# Patient Record
Sex: FEMALE | Race: WHITE | Employment: OTHER | URBAN - METROPOLITAN AREA
[De-identification: names, ages, dates, MRNs, and addresses within clinical notes are randomized per-mention and may not be internally consistent; named-entity substitution may affect disease eponyms.]

---

## 2018-10-19 ENCOUNTER — HOSPITAL ENCOUNTER (OUTPATIENT)
Dept: CT IMAGING | Age: 58
Discharge: HOME | End: 2018-10-19
Attending: UROLOGY
Payer: COMMERCIAL

## 2018-10-19 DIAGNOSIS — R31.0 GROSS HEMATURIA: ICD-10-CM

## 2018-10-19 PROCEDURE — 74011636320 HC RX REV CODE- 636/320: Performed by: UROLOGY

## 2018-10-19 PROCEDURE — 74178 CT ABD&PLV WO CNTR FLWD CNTR: CPT

## 2018-10-19 RX ADMIN — IOPAMIDOL 100 ML: 755 INJECTION, SOLUTION INTRAVENOUS at 15:40

## 2019-02-07 ENCOUNTER — HOSPITAL ENCOUNTER (OUTPATIENT)
Age: 59
Setting detail: OUTPATIENT SURGERY
Discharge: HOME | End: 2019-02-07
Attending: UROLOGY | Admitting: UROLOGY
Payer: COMMERCIAL

## 2019-02-07 VITALS
HEIGHT: 67 IN | TEMPERATURE: 98.5 F | OXYGEN SATURATION: 100 % | HEART RATE: 73 BPM | RESPIRATION RATE: 9 BRPM | BODY MASS INDEX: 21.74 KG/M2 | SYSTOLIC BLOOD PRESSURE: 118 MMHG | DIASTOLIC BLOOD PRESSURE: 72 MMHG | WEIGHT: 138.5 LBS

## 2019-02-07 PROCEDURE — 50590 FRAGMENTING OF KIDNEY STONE: CPT | Performed by: UROLOGY

## 2019-02-07 PROCEDURE — 74011250636 HC RX REV CODE- 250/636: Performed by: UROLOGY

## 2019-02-07 PROCEDURE — 99153 MOD SED SAME PHYS/QHP EA: CPT

## 2019-02-07 PROCEDURE — 76010000138 HC OR TIME 0.5 TO 1 HR: Performed by: UROLOGY

## 2019-02-07 PROCEDURE — 74011250636 HC RX REV CODE- 250/636

## 2019-02-07 PROCEDURE — 77030032490 HC SLV COMPR SCD KNE COVD -B: Performed by: UROLOGY

## 2019-02-07 PROCEDURE — 76210000020 HC REC RM PH II FIRST 0.5 HR: Performed by: UROLOGY

## 2019-02-07 PROCEDURE — 99152 MOD SED SAME PHYS/QHP 5/>YRS: CPT

## 2019-02-07 RX ORDER — NALOXONE HYDROCHLORIDE 1 MG/ML
1 INJECTION INTRAMUSCULAR; INTRAVENOUS; SUBCUTANEOUS AS NEEDED
Status: DISCONTINUED | OUTPATIENT
Start: 2019-02-07 | End: 2019-02-07 | Stop reason: HOSPADM

## 2019-02-07 RX ORDER — MIDAZOLAM HYDROCHLORIDE 5 MG/ML
6 INJECTION INTRAMUSCULAR; INTRAVENOUS AS NEEDED
Status: DISCONTINUED | OUTPATIENT
Start: 2019-02-07 | End: 2019-02-07 | Stop reason: HOSPADM

## 2019-02-07 RX ORDER — OXYCODONE AND ACETAMINOPHEN 5; 325 MG/1; MG/1
1 TABLET ORAL
Status: CANCELLED | OUTPATIENT
Start: 2019-02-07

## 2019-02-07 RX ORDER — OXYCODONE AND ACETAMINOPHEN 5; 325 MG/1; MG/1
2 TABLET ORAL
Status: CANCELLED | OUTPATIENT
Start: 2019-02-07

## 2019-02-07 RX ORDER — SODIUM CHLORIDE, SODIUM LACTATE, POTASSIUM CHLORIDE, CALCIUM CHLORIDE 600; 310; 30; 20 MG/100ML; MG/100ML; MG/100ML; MG/100ML
125 INJECTION, SOLUTION INTRAVENOUS CONTINUOUS
Status: DISCONTINUED | OUTPATIENT
Start: 2019-02-07 | End: 2019-02-07 | Stop reason: HOSPADM

## 2019-02-07 RX ORDER — FLUMAZENIL 0.1 MG/ML
0.5 INJECTION INTRAVENOUS AS NEEDED
Status: DISCONTINUED | OUTPATIENT
Start: 2019-02-07 | End: 2019-02-07 | Stop reason: HOSPADM

## 2019-02-07 RX ORDER — SODIUM CHLORIDE, SODIUM LACTATE, POTASSIUM CHLORIDE, CALCIUM CHLORIDE 600; 310; 30; 20 MG/100ML; MG/100ML; MG/100ML; MG/100ML
75 INJECTION, SOLUTION INTRAVENOUS CONTINUOUS
Status: CANCELLED | OUTPATIENT
Start: 2019-02-07

## 2019-02-07 RX ORDER — FENTANYL CITRATE 50 UG/ML
300 INJECTION, SOLUTION INTRAMUSCULAR; INTRAVENOUS AS NEEDED
Status: DISCONTINUED | OUTPATIENT
Start: 2019-02-07 | End: 2019-02-07 | Stop reason: HOSPADM

## 2019-02-07 RX ADMIN — FENTANYL CITRATE 100 MCG: 50 INJECTION, SOLUTION INTRAMUSCULAR; INTRAVENOUS at 17:50

## 2019-02-07 RX ADMIN — MIDAZOLAM HYDROCHLORIDE 2 MG: 5 INJECTION INTRAMUSCULAR; INTRAVENOUS at 17:34

## 2019-02-07 RX ADMIN — FENTANYL CITRATE 100 MCG: 50 INJECTION, SOLUTION INTRAMUSCULAR; INTRAVENOUS at 17:34

## 2019-02-07 RX ADMIN — SODIUM CHLORIDE, SODIUM LACTATE, POTASSIUM CHLORIDE, AND CALCIUM CHLORIDE 125 ML/HR: 600; 310; 30; 20 INJECTION, SOLUTION INTRAVENOUS at 16:40

## 2019-02-07 NOTE — INTERVAL H&P NOTE
H&P Update: 
Kiley Guevara was seen and examined. History and physical has been reviewed. The patient has been examined.  There have been no significant clinical changes since the completion of the originally dated History and Physical. 
 
Signed By: Dorothy Todd MD   
 February 7, 2019 4:46 PM

## 2019-02-07 NOTE — INTERVAL H&P NOTE
H&P Update: 
Inna Ramirez was seen and examined. History and physical has been reviewed. The patient has been examined.  There have been no significant clinical changes since the completion of the originally dated History and Physical. 
 
Signed By: Favian Garcia MD   
 February 7, 2019 4:48 PM

## 2019-02-07 NOTE — H&P
Urology [address]  [address] Tel: [phone] Fax: [phone] Patient: Zulay Lazo YOB: 1960 Date: 02/05/2019 Visit Type: Pre Op H&P Assessment/Plan # Detail Type Description 1. Assessment Gross hematuria (R31.0). Patient Plan Pt had gross hematuria and aury flank pain. Today she underwent a cysto, no abnormalities were identified. She did have a CT scan on 10/19/2018 which revealed a 3mm non-obstructing stone in the mid pole of the right kidney, a 5x8mm stone in the mid pole of the left kidney. No obstructing stone was noted. No other abnormalities were noted. I did explain to the pt that I did have some concern about the size the stone in the mid pole of the left kidney. We discussed options and she would like to pursue a lithotripsy. 2. Assessment Kidney stone (N20.0). Patient Plan Pt noted to have aury renal stones. I am concerned about the one in the left kidney due to the size. We will obtain a KUB. If we can see the stone we will schedule her for a lithotripsy. All risks including pain, infection, bleeding, need for possible repeat procedure were reviewed. She understands and agrees. 3. Assessment Frequency of micturition (R35.0). Patient Plan Pt treated at her last visit with 25mg samples of Myrbetriq. This is working well. She still had not started the 50mg samples that I had given her originally so she will increase up to 50mg and let me know if she needs an Rx and for which dosing. 4. Assessment Urgency of urination (R39.15). 5. Assessment Urge incontinence (N39.41). This 60year old female presents for Overactive Bladder and Kidney and/or Ureteral Stone. History of Present Illness: 1. Overactive Bladder Onset was gradual. It occurs daily. The problem is worse. Associated symptoms include hematuria, urgency and urinary incontinence (urgency). Pertinent negatives include chills, constipation, fever and nocturia. Additional information: Pt with significant urge incontinence. Her daughter has this as well. She does not drink caffeine  She is on 25mg samples and works well. She will advance to 50mg if needed. Brandyn Crawford 2.  Kidney and/or Ureteral Stone Onset was sudden. The problem is with no change. Location of pain is none. The pain does not radiate. Associated symptoms include hematuria. Pertinent negatives include chills, constipation, diarrhea, fever, nausea and vomiting. Additional information: Pt noted to have aury renal stones. I am concerned about the one in the left kidney due to the size. KUB confirms 5x8mm stone mid pole LEFT Kidney. PAST MEDICAL/SURGICAL HISTORY   (Reviewed, updated) Disease/disorder Onset Date Management Date Comments  
  dermatology surgical intervention  [facility] 2015 - basal cell carcinoma, atypical mole  
traumatic injury  shoulder surgery 2014   
  screening colonoscopy 10/17/2013 PROBLEM LIST:    
Problem Description Onset Date Chronic Clinical Status Notes Broken bone  Y Medications (active prior to today) Medication Instructions Start Date Stop Date Refilled Elsewhere  
calcium carbonate 300 mg (750 mg) chewable tablet take 1 tablet by oral route 2 times every day 2018   N Vitamin D3 1,000 unit tablet Pt takes 800 mg in the am and 1000mg in pm. 2018   N Allergies Ingredient Reaction (Severity) Medication Name Comment NO KNOWN ALLERGIES Family History  (Reviewed, updated) Relationship Family Member Name  Age at Death Condition Onset Age Cause of Death Father  Y  Myocardial infarction  Y Father  Marely Royal Father  Y  Heart disease  N Mother    Cancer, ovarian  N Mother    Cancer -Non-Hodgkin's lymphoma 75 [address] Social History:  (Reviewed, updated) Preferred language is [place]. Smoking status: Never smoker. ALCOHOL There is no history of alcohol use. CAFFEINE The patient does not use caffeine. Review of Systems System Neg/Pos Details Constitutional Negative Chills and Fever. ENMT Negative Ear infections and Sore throat. Eyes Negative Blurred vision, Double vision and Eye pain. Respiratory Negative Asthma, Chronic cough, Dyspnea and Wheezing. Cardio Negative Chest pain. GI Negative Constipation, Decreased appetite, Diarrhea, Nausea and Vomiting.  Positive Hematuria, Urgency, Urinary incontinence.  Negative Nocturia. Endocrine Negative Cold intolerance, Heat intolerance, Increased thirst and Weight loss. Neuro Negative Headache and Tremors. Psych Negative Anxiety and Depression. Integumentary Negative Itching skin and Rash. MS Negative Back pain and Joint pain. Hema/Lymph Negative Easy bleeding. Physical Exam 
Exam Findings Details Constitutional Normal Well developed. Neck Exam Normal Inspection - Normal.  
Respiratory Normal Inspection - Normal.  
Genitourinary Normal Urethral meatus - Normal. External genitalia - Normal. Perineum - Normal.  
Extremity Normal No Edema. Psychiatric Normal Orientation - Oriented to time, place, person & situation. Appropriate mood and affect. Medications (added, continued, or stopped today) Start Date Medication Directions PRN Status PRN Reason Instruction Stop Date  
01/31/2018 calcium carbonate 300 mg (750 mg) chewable tablet take 1 tablet by oral route 2 times every day N     
01/31/2018 Vitamin D3 1,000 unit tablet Pt takes 800 mg in the am and 1000mg in pm. N Active Patient Care Team Members Name Contact Agency Type Support Role Relationship Active Date Inactive Date Specialty Tomas Dickson   Patient provider PCP   Family Practice Jaciel Us   encounter provider    Urology Pop Maradiaga   encounter provider    Endocrinology Percy Adames   Emergency Contact Spouse Alec Terrazasing    Spouse Provider:  
Jany Harris  02/06/2019 9:43 PM  
Document generated by:  Salvador Rios.  Julianne 02/06/2019 09:43 PM

## 2019-02-07 NOTE — DISCHARGE INSTRUCTIONS
DISCHARGE SUMMARY from Nurse    PATIENT INSTRUCTIONS:    After general anesthesia or intravenous sedation, for 24 hours or while taking prescription Narcotics:  · Limit your activities  · Do not drive and operate hazardous machinery  · Do not make important personal or business decisions  · Do  not drink alcoholic beverages  · If you have not urinated within 8 hours after discharge, please contact your surgeon on call. Report the following to your surgeon:  · Excessive pain, swelling, redness or odor of or around the surgical area  · Temperature over 100.5  · Nausea and vomiting lasting longer than 4 hours or if unable to take medications  · Any signs of decreased circulation or nerve impairment to extremity: change in color, persistent  numbness, tingling, coldness or increase pain  · Any questions    What to do at Home:  Recommended activity: Activity as tolerated and no driving for today    If you experience any of the following symptoms above, please follow up with Dr. Reynaldo Watson. *  Please give a list of your current medications to your Primary Care Provider. *  Please update this list whenever your medications are discontinued, doses are      changed, or new medications (including over-the-counter products) are added. *  Please carry medication information at all times in case of emergency situations. These are general instructions for a healthy lifestyle:    No smoking/ No tobacco products/ Avoid exposure to second hand smoke  Surgeon General's Warning:  Quitting smoking now greatly reduces serious risk to your health.     Obesity, smoking, and sedentary lifestyle greatly increases your risk for illness    A healthy diet, regular physical exercise & weight monitoring are important for maintaining a healthy lifestyle    You may be retaining fluid if you have a history of heart failure or if you experience any of the following symptoms:  Weight gain of 3 pounds or more overnight or 5 pounds in a week, increased swelling in our hands or feet or shortness of breath while lying flat in bed. Please call your doctor as soon as you notice any of these symptoms; do not wait until your next office visit. Recognize signs and symptoms of STROKE:    F-face looks uneven    A-arms unable to move or move unevenly    S-speech slurred or non-existent    T-time-call 911 as soon as signs and symptoms begin-DO NOT go       Back to bed or wait to see if you get better-TIME IS BRAIN. Warning Signs of HEART ATTACK     Call 911 if you have these symptoms:   Chest discomfort. Most heart attacks involve discomfort in the center of the chest that lasts more than a few minutes, or that goes away and comes back. It can feel like uncomfortable pressure, squeezing, fullness, or pain.  Discomfort in other areas of the upper body. Symptoms can include pain or discomfort in one or both arms, the back, neck, jaw, or stomach.  Shortness of breath with or without chest discomfort.  Other signs may include breaking out in a cold sweat, nausea, or lightheadedness. Don't wait more than five minutes to call 911 - MINUTES MATTER! Fast action can save your life. Calling 911 is almost always the fastest way to get lifesaving treatment. Emergency Medical Services staff can begin treatment when they arrive -- up to an hour sooner than if someone gets to the hospital by car. The discharge information has been reviewed with the patient and caregiver. The patient and caregiver verbalized understanding. Discharge medications reviewed with the patient and caregiver and appropriate educational materials and side effects teaching were provided.   ___________________________________________________________________________________________________________________________________    Patient armband removed and shredded

## 2021-03-25 NOTE — PROCEDURES
Extracorporeal Shock Wave Lithotripsy Operative Report    Date of Surgery: 2/7/2019    Preoperative Diagnosis: LEFT KIDNEY STONES    Postoperative Diagnosis:  left renal calculus    Surgeon: Dariel Benton MD    Assistants: Surgeon(s):  Juan Mcgee MD    Anesthesia:  Con-Sed     Procedure: Procedure(s):  LEFT RENAL EXTRA CORPOREAL SHOCKWAVE LITHOTRIPSY (IV SEDATION)    Procedure in Detail:    The risks, benefits, complications, treatment options, and expected outcomes were discussed with the patient. The patient concurred with the proposed plan, giving informed consent. Time out was held prior to procedure. The patient was placed in the supine position. The stone was aligned using fluoroscopic examination. The patient was then given 3000 at a maximum kV of 7. The patient tolerated the procedure well. Findings: There appeared to be mild changes in the stone.     Estimated Blood Loss:  None    Specimens: none       Implants: none            Condition: stable    Signed By: Dariel Benton MD     February 7, 2019 none

## 2022-04-22 ENCOUNTER — TRANSCRIBE ORDER (OUTPATIENT)
Dept: REGISTRATION | Age: 62
End: 2022-04-22

## 2022-04-22 ENCOUNTER — HOSPITAL ENCOUNTER (OUTPATIENT)
Dept: NON INVASIVE DIAGNOSTICS | Age: 62
Discharge: HOME | End: 2022-04-22
Payer: COMMERCIAL

## 2022-04-22 DIAGNOSIS — Z15.01 GENETIC SUSCEPTIBILITY TO MALIGNANT NEOPLASM OF BREAST: ICD-10-CM

## 2022-04-22 DIAGNOSIS — Z15.09 GENETIC SUSCEPTIBILITY TO OTHER MALIGNANT NEOPLASM: ICD-10-CM

## 2022-04-22 DIAGNOSIS — Z15.09 GENETIC SUSCEPTIBILITY TO OTHER MALIGNANT NEOPLASM: Primary | ICD-10-CM

## 2022-04-22 LAB
ATRIAL RATE: 48 BPM
CALCULATED P AXIS, ECG09: 82 DEGREES
CALCULATED R AXIS, ECG10: 50 DEGREES
CALCULATED T AXIS, ECG11: 67 DEGREES
DIAGNOSIS, 93000: NORMAL
P-R INTERVAL, ECG05: 196 MS
Q-T INTERVAL, ECG07: 454 MS
QRS DURATION, ECG06: 90 MS
QTC CALCULATION (BEZET), ECG08: 405 MS
VENTRICULAR RATE, ECG03: 48 BPM

## 2022-04-22 PROCEDURE — 93005 ELECTROCARDIOGRAM TRACING: CPT

## 2022-05-09 ENCOUNTER — HOSPITAL ENCOUNTER (OUTPATIENT)
Dept: LAB | Age: 62
Discharge: HOME | End: 2022-05-09
Payer: COMMERCIAL

## 2022-05-09 PROCEDURE — 88305 TISSUE EXAM BY PATHOLOGIST: CPT

## 2023-05-23 ENCOUNTER — HOSPITAL ENCOUNTER (OUTPATIENT)
Facility: HOSPITAL | Age: 63
Setting detail: RECURRING SERIES
Discharge: HOME | End: 2023-05-26
Payer: COMMERCIAL

## 2023-05-23 PROCEDURE — 97535 SELF CARE MNGMENT TRAINING: CPT

## 2023-05-23 PROCEDURE — 97161 PT EVAL LOW COMPLEX 20 MIN: CPT

## 2023-05-23 NOTE — PROGRESS NOTES
[facility]  [address], [address]  Ph [phone]  Fx [phone]    Plan of Care/ Statement of Necessity for Physical Therapy Services      Patient name: Noam Ayala Start of Care: 2023   Referral source: Yesika Hebert,* : 1960    Medical Diagnosis: Urge incontinence [N39.41]   Onset Date:2013    Treatment Diagnosis: Urge incontinence [N39.41]   Prior Hospitalization: see medical history Provider#: [id number]   Medications: Verified on Patient summary List   Comorbidities:  2 pregnancies, both vaginal; Hx tearing; oophorectomy 1 year ago; Hx kidney stones, Hx hemmorroids from birth of first child, osteopenia, thyroid nodules  Prior Level of Function: no leakage, living life without being interrupted by urinary urgency, no anxiety to know where bathrooms are      The Plan of Care and following information is based on the information from the initial evaluation. Assessment/ key information:  Patient is a 57year old female presenting to Physical Therapy with c/o urinary urgency leading to urinary incontinence which is limiting ability function at previous level. Patient's internal PFM assessment revealed normal muscle tone without pain and good strength and endurance, indicating symptoms are likely due to poor bladder fitness and habits. On interview, patient has infrequent stress UI that does not bother her. Patient presents with limited understanding of bladder and bowel anatomy/function, dietary irritants, and urge suppression. I feel patient would benefit from skilled therapeutic intervention to optimize highest functional level possible.    Evaluation Complexity HistoryHIGH Complexity :3+ comorbidities / personal factors will impact the outcome/ POC  ; Examination LOW Complexity : 1-2 Standardized tests and measures addressing body structure, function, activity limitation and / or participation in recreation  ;Presentation LOW Complexity

## 2023-05-23 NOTE — PROGRESS NOTES
Physical Therapy Evaluation        Patient Name: Britt Lugo    Date: 2023    : 1960  Insurance: Payor: Caitlyn Cullen / Plan: MARIA EUGENIA WADDELL FEDERAL / Product Type: *No Product type* /      Patient  verified yes     Visit #   Current / Total 1 8   Time   In / Out 2:32 3:15   Pain   In / Out 0 0   Subjective Functional Status/Changes: See below   Changes to:  Meds, Allergies, Med Hx, Sx Hx?   If yes, update Summary List yes       TREATMENT AREA =  Urge incontinence [N39.41]    SUBJECTIVE   Current Complaint/History: urinary urgency multiple times daily leading to some incontinence over the last 10 years, insidiously    Symptom progression: [ X]no change since onset    Pain Levels (0-10 scale): no pain with symptoms, patient denies    Previous Treatment/Compliance: Has had medication for urge, which significantly improved but patient prefers not to take  Obstetrical/Gynecological History:  Pregnancies/ Births: 2 pregnancies, both vaginal; Hx tearing  Other OB/GYN intervention: oophorectomy 1 year ago due to potential for ovarian cancer  PMHx/Surgical Hx: Hx kidney stones, Hx hemmorroids from birth of first child, osteopenia, thyroid nodules    Work Hx: retired, volunteers: food pantry, houses soccer players, taxes  Living Situation: house, lives with  stairs in home  Current exercise/activity level: daily exercises: biking, running, strength training  Pt Goals: would like to not having to run to the bathroom when desperate  Barriers to treatment: none    Current urinary complaints and PLOF: Patient reports:  - Stress urinary Incontinence at frequency of 1/week during sneezing and lifting weights or deep squat; does not utilize pantiliners, underwear from a few drops to wet; PLOF/impact: no leakage 10 years ago, not chief complaint    - Urgency urinary Incontinence at frequency of average 3-4/day during all activities; leaks 1-2/ times per day from a few drops to wet; sometimes only needs relief of a few

## 2023-06-07 ENCOUNTER — HOSPITAL ENCOUNTER (OUTPATIENT)
Facility: HOSPITAL | Age: 63
Setting detail: RECURRING SERIES
Discharge: HOME | End: 2023-06-10
Payer: COMMERCIAL

## 2023-06-07 PROCEDURE — 97535 SELF CARE MNGMENT TRAINING: CPT

## 2023-06-07 PROCEDURE — 97530 THERAPEUTIC ACTIVITIES: CPT

## 2023-06-07 PROCEDURE — 97112 NEUROMUSCULAR REEDUCATION: CPT

## 2023-06-07 NOTE — PROGRESS NOTES
resulting in leakage of a few drops to wet. PLOF/impact: no urgency, interrupts QOL and daily functioning, anxiety over always needing to know where bathroom is      *Patient will report daytime interval voiding of 3-4 hours to complete activities but demonstrate appropriate fluid intake. Status at evaluation: patient reports daytime voiding intervals of 30 minutes to 6 hours (based on timing of drinking water)     * Function Patient will demonstrate improvement of current complaints evidenced by an improvement in FOTO Urinary problem score to 57 points .   Status at evaluation: FOTO Urinary Problem score: 48    PLAN  Yes  Continue plan of care  []  Upgrade activities as tolerated  []  Discharge due to :  []  Other:    Karthik Session, [facility]    6/7/2023    8:05 AM    Future Appointments   Date Time Provider Mario Parekh   6/7/2023  1:50 PM Karthik Session, [facility]   6/14/2023  1:10 PM Lisa Philippe, [facility]   6/21/2023  1:50 PM Karthik Session, [facility]   6/28/2023  1:50 PM Karthik Session, [facility]   7/5/2023  1:50 PM Karthik Session, [facility]   7/12/2023  1:50 PM Karthik Session, [facility]   7/19/2023  1:10 PM Lisa Philippe, [facility]   7/26/2023  1:50 PM Karthik Session, [facility]

## 2023-06-14 ENCOUNTER — APPOINTMENT (OUTPATIENT)
Facility: HOSPITAL | Age: 63
End: 2023-06-14
Payer: COMMERCIAL

## 2023-06-21 ENCOUNTER — HOSPITAL ENCOUNTER (OUTPATIENT)
Facility: HOSPITAL | Age: 63
Setting detail: RECURRING SERIES
Discharge: HOME | End: 2023-06-24
Payer: COMMERCIAL

## 2023-06-21 PROCEDURE — 97112 NEUROMUSCULAR REEDUCATION: CPT

## 2023-06-21 PROCEDURE — 97530 THERAPEUTIC ACTIVITIES: CPT

## 2023-06-21 NOTE — PROGRESS NOTES
[facility]  [address] Caleb Sims, [address]  Ph [phone]  Fx [phone]    Physical Therapy Progress Note  Patient name: Sandie Sewell Start of Care: 2023   Referral source: Yesika Hebert,* : 1960               Medical Diagnosis: Urge incontinence [N39.41]    Onset Date:2013               Treatment Diagnosis: Urge incontinence [N39.41]   Prior Hospitalization: see medical history Provider#: [id number]   Medications: Verified on Patient summary List   Comorbidities:  2 pregnancies, both vaginal; Hx tearing; oophorectomy 1 year ago; Hx kidney stones, Hx hemmorroids from birth of first child, osteopenia, thyroid nodules  Prior Level of Function: no leakage, living life without being interrupted by urinary urgency, no anxiety to know where bathrooms are      Visits from Start of Care: 3    Missed Visits: 1    Updated Goals/Measure of Progress: To be achieved in 5 treatments:  Short term goals: To be achieved in 4 weeks:     *Patient will report adherence to HEP as recommended for active participation and progression of interventions during therapy. Status at evaluation: To be issued at first follow-up  Current: pt provided with HEP ShipEarly access code: [id number] initiated 23  Current: pt reports HEP semi-compliance (3-4x/week) progressing 23        * Patient will positively change fluid/dietary habits to bulk of volume during the day, tapering at night, and no longer restricting fluids based on education and report a subjective improvement in bladder symptoms. Status at evaluation: Patient reports 32 ounces water during day/ 32-64 ounces in evening, occasional alcoholic drinks and is unaware of bladder irritant effects on symptoms.  Patient reports drinking water first thing in the morning initiates a difficult day with increased urgency; voiding in the morning seems to be a bladder irritant the rest of the day; will restrict water intake to
always looking for the bathroom when out of the house. Status at evaluation:patient reports urge suppression of 30 seconds to 2 hours resulting in leakage of a few drops to wet. PLOF/impact: no urgency, interrupts QOL and daily functioning, anxiety over always needing to know where bathroom is  Current: pt continues to have a strong association between seeing the toilet and leaking. Patient encouraged to practice urge suppression in restroom to improve continence until appropriate time to void. Review of urge suppression strategy. slow progression 6/21/23      *Patient will report daytime interval voiding of 3-4 hours to complete activities but demonstrate appropriate fluid intake. Status at evaluation: patient reports daytime voiding intervals of 30 minutes to 6 hours (based on timing of drinking water)  Current: pt educated to void every 4 hours during the day. progressing 6/21/23     * Function Patient will demonstrate improvement of current complaints evidenced by an improvement in FOTO Urinary problem score to 57 points .   Status at evaluation: FOTO Urinary Problem score: 48  Current: will assess at 5th visit 6/21/23    PLAN  Yes  Continue plan of care  []  Upgrade activities as tolerated  []  Discharge due to :  []  Other:    Amita Guerrero PTA    6/21/2023    9:09 AM    Future Appointments   Date Time Provider Mario Parekh   6/21/2023  1:50 PM Amita Guerrero PTA [facility]   6/28/2023  1:50 PM Amita Guerrero PTA [facility]   7/5/2023  1:50 PM Amita Guerrero PTA [facility]   7/12/2023  1:50 PM Amita Guerrero PTA [facility]   7/19/2023  1:10 PM Elly Stevens PT [facility]   7/26/2023  1:50 PM Amita Guerrero PTA [facility]

## 2023-06-28 ENCOUNTER — HOSPITAL ENCOUNTER (OUTPATIENT)
Facility: HOSPITAL | Age: 63
Setting detail: RECURRING SERIES
Discharge: HOME | End: 2023-07-01
Payer: COMMERCIAL

## 2023-06-28 PROCEDURE — 97535 SELF CARE MNGMENT TRAINING: CPT

## 2023-06-28 PROCEDURE — 97112 NEUROMUSCULAR REEDUCATION: CPT

## 2023-07-05 ENCOUNTER — HOSPITAL ENCOUNTER (OUTPATIENT)
Facility: HOSPITAL | Age: 63
Setting detail: RECURRING SERIES
Discharge: HOME | End: 2023-07-08
Payer: COMMERCIAL

## 2023-07-05 PROCEDURE — 97110 THERAPEUTIC EXERCISES: CPT

## 2023-07-05 PROCEDURE — 97112 NEUROMUSCULAR REEDUCATION: CPT

## 2023-07-05 PROCEDURE — 97530 THERAPEUTIC ACTIVITIES: CPT

## 2023-07-05 NOTE — PROGRESS NOTES
PHYSICAL / OCCUPATIONAL THERAPY - DAILY TREATMENT NOTE (updated )    Patient Name: Mercy Lee    Date: 2023    : 1960  Insurance: Payor: Christin Francis / Plan: [employer] / Product Type: *No Product type* /      Patient  verified Yes     Visit #   Current / Total 5 8   Time   In / Out 1:50 PM 2:30 PM   Pain   In / Out 0 0   Subjective Functional Status/Changes: Patient reports increased urge today; however, denies leaking. Changes to: Allergies, Med Hx, Sx Hx?   no       TREATMENT AREA =  Urge incontinence [N39.41]    OBJECTIVE      Therapeutic Procedures: Tx Min Billable or 1:1 Min (if diff from Tx Min) Procedure, Rationale, Specifics   10  41515 Therapeutic Exercise (timed):  increase ROM, strength, coordination, balance, and proprioception to improve patient's ability to progress to PLOF and address remaining functional goals. (see flow sheet as applicable)     Details if applicable:       20  77953 Neuromuscular Re-Education (timed):  improve balance, coordination, kinesthetic sense, posture, core stability and proprioception to improve patient's ability to develop conscious control of individual muscles and awareness of position of extremities in order to progress to PLOF and address remaining functional goals. (see flow sheet as applicable)     Details if applicable:     10  77745 Therapeutic Activity (timed):  use of dynamic activities replicating functional movements to increase ROM, strength, coordination, balance, and proprioception in order to improve patient's ability to progress to PLOF and address remaining functional goals.   (see flow sheet as applicable)     Details if applicable:            Details if applicable:            Details if applicable:     36  [facility] Totals Reminder: bill using total billable min of TIMED therapeutic procedures (example: do not include dry needle or estim unattended, both untimed codes, in totals to left)  8-22 min = 1 unit; 23-37 min = 2 units;

## 2023-07-12 ENCOUNTER — HOSPITAL ENCOUNTER (OUTPATIENT)
Facility: HOSPITAL | Age: 63
Setting detail: RECURRING SERIES
Discharge: HOME | End: 2023-07-15
Payer: COMMERCIAL

## 2023-07-12 PROCEDURE — 97535 SELF CARE MNGMENT TRAINING: CPT

## 2023-07-12 PROCEDURE — 97112 NEUROMUSCULAR REEDUCATION: CPT

## 2023-07-12 PROCEDURE — 97110 THERAPEUTIC EXERCISES: CPT

## 2023-07-12 NOTE — PROGRESS NOTES
Physical Therapy Discharge Instructions    [facility] at [address]  [address], [address] [facility]  Ph [phone]  Fx [phone]      Patient: Nikole Oneal  : 1960      Continue Home Exercise Program 1 times per day for 4-6 weeks, then decrease to 3-5 times per week              Follow up with MD:     [] Upon completion of therapy     [x] As needed      Recommendations:     [x]   Return to activity with home program    []   Return to activity with the following modifications:       []Post Rehab Program    []Join Independent aquatic program     []Return to/join local gym        Additional Comments: Continue to practice breathing for IAP management.            Jose Roberto Gallagher, PTA 2023 12:03 PM

## 2023-07-12 NOTE — PROGRESS NOTES
PHYSICAL / OCCUPATIONAL THERAPY - DAILY TREATMENT NOTE (updated )    Patient Name: Kika Wong    Date: 2023    : 1960  Insurance: Payor: Seth Meeks / Plan: Munira Medellin / Product Type: *No Product type* /      Patient  verified Yes     Visit #   Current / Total 6 8   Time   In / Out 11:51 AM 12:31 PM   Pain   In / Out 0 0   Subjective Functional Status/Changes: Patient reports that she is doing great. \"I feel so much more confident. \"   Changes to: Allergies, Med Hx, Sx Hx?   no       TREATMENT AREA =  Urge incontinence [N39.41]      OBJECTIVE      Therapeutic Procedures: Tx Min Billable or 1:1 Min (if diff from Tx Min) Procedure, Rationale, Specifics   18  01144 Therapeutic Exercise (timed):  increase ROM, strength, coordination, balance, and proprioception to improve patient's ability to progress to PLOF and address remaining functional goals. (see flow sheet as applicable)     Details if applicable:       10  09593 Neuromuscular Re-Education (timed):  improve balance, coordination, kinesthetic sense, posture, core stability and proprioception to improve patient's ability to develop conscious control of individual muscles and awareness of position of extremities in order to progress to PLOF and address remaining functional goals. (see flow sheet as applicable)     Details if applicable:     12  06955 Self Care/Home Management (timed):  improve patient knowledge and understanding of positioning, posture/ergonomics, home safety, activity modification, and physical therapy expectations, procedures and progression  to improve patient's ability to progress to PLOF and address remaining functional goals.   (see flow sheet as applicable)     Details if applicable:            Details if applicable:            Details if applicable:     36  [facility] Totals Reminder: bill using total billable min of TIMED therapeutic procedures (example: do not include dry needle or estim unattended, both untimed

## 2023-07-12 NOTE — PROGRESS NOTES
[facility] [address], [address] [facility]  Ph [phone]  Fx [phone]    Physical Therapy Discharge Summary    Patient name: Alondra Augustine Start of Care: 2023   Referral source: Yesika Hebert,* : 1960               Medical Diagnosis: Urge incontinence [N39.41]    Onset Date:2013               Treatment Diagnosis: Urge incontinence [N39.41]   Prior Hospitalization: see medical history Provider#: [id number]   Medications: Verified on Patient summary List   Comorbidities:  2 pregnancies, both vaginal; Hx tearing; oophorectomy 1 year ago; Hx kidney stones, Hx hemmorroids from birth of first child, osteopenia, thyroid nodules  Prior Level of Function: no leakage, living life without being interrupted by urinary urgency, no anxiety to know where bathrooms are       Visits from Start of Care: 6    Missed Visits: 1    Reporting Period : 2023 to 2023    Goals/Measure of Progress:  Short term goals: T     *Patient will report adherence to HEP as recommended for active participation and progression of interventions during therapy. Status at evaluation: To be issued at first follow-up  Current: pt provided with HEP [employer] access code: [email or web address] initiated 23  Current: pt reports HEP semi-compliance (3-4x/week) progressing 23  Current: pt is compliant with all HEP and is cycling and participating in exercise classes during the week MET 23     * Patient will positively change fluid/dietary habits to bulk of volume during the day, tapering at night, and no longer restricting fluids based on education and report a subjective improvement in bladder symptoms. Status at evaluation: Patient reports 32 ounces water during day/ 32-64 ounces in evening, occasional alcoholic drinks and is unaware of bladder irritant effects on symptoms.  Patient reports drinking water first thing in the morning initiates a difficult day with increased urgency;

## 2023-07-19 ENCOUNTER — APPOINTMENT (OUTPATIENT)
Facility: HOSPITAL | Age: 63
End: 2023-07-19
Payer: COMMERCIAL

## 2023-07-26 ENCOUNTER — APPOINTMENT (OUTPATIENT)
Facility: HOSPITAL | Age: 63
End: 2023-07-26
Payer: COMMERCIAL

## 2023-12-21 ENCOUNTER — HOSPITAL ENCOUNTER (OUTPATIENT)
Facility: HOSPITAL | Age: 63
Discharge: HOME | End: 2023-12-24
Payer: COMMERCIAL

## 2023-12-21 PROCEDURE — 87077 CULTURE AEROBIC IDENTIFY: CPT

## 2023-12-21 PROCEDURE — 87205 SMEAR GRAM STAIN: CPT

## 2023-12-21 PROCEDURE — 87102 FUNGUS ISOLATION CULTURE: CPT

## 2023-12-21 PROCEDURE — 87185 SC STD ENZYME DETCJ PER NZM: CPT

## 2023-12-21 PROCEDURE — 87116 MYCOBACTERIA CULTURE: CPT

## 2023-12-21 PROCEDURE — 87070 CULTURE OTHR SPECIMN AEROBIC: CPT

## 2023-12-21 PROCEDURE — 87206 SMEAR FLUORESCENT/ACID STAI: CPT

## 2023-12-22 ENCOUNTER — HOSPITAL ENCOUNTER (OUTPATIENT)
Facility: HOSPITAL | Age: 63
Discharge: HOME | End: 2023-12-25
Payer: COMMERCIAL

## 2023-12-22 DIAGNOSIS — R91.8 OTHER NONSPECIFIC ABNORMAL FINDING OF LUNG FIELD: ICD-10-CM

## 2023-12-22 PROCEDURE — 87116 MYCOBACTERIA CULTURE: CPT

## 2023-12-22 PROCEDURE — 87206 SMEAR FLUORESCENT/ACID STAI: CPT

## 2023-12-23 LAB
BACTERIA SPEC CULT: ABNORMAL
BACTERIA SPEC CULT: ABNORMAL
GRAM STN SPEC: ABNORMAL
SERVICE CMNT-IMP: ABNORMAL

## 2023-12-25 LAB
ACID FAST STN SPEC: NEGATIVE
BACTERIA SPEC CULT: NORMAL
MYCOBACTERIUM SPEC QL CULT: NORMAL
SERVICE CMNT-IMP: NORMAL
SPECIMEN PREPARATION: NORMAL
SPECIMEN SOURCE: NORMAL

## 2023-12-27 ENCOUNTER — HOSPITAL ENCOUNTER (OUTPATIENT)
Facility: HOSPITAL | Age: 63
Discharge: HOME | End: 2023-12-30
Payer: COMMERCIAL

## 2023-12-27 DIAGNOSIS — R91.8 OTHER NONSPECIFIC ABNORMAL FINDING OF LUNG FIELD: ICD-10-CM

## 2023-12-27 PROCEDURE — 87116 MYCOBACTERIA CULTURE: CPT

## 2023-12-27 PROCEDURE — 87206 SMEAR FLUORESCENT/ACID STAI: CPT

## 2023-12-28 LAB
ACID FAST STN SPEC: NEGATIVE
MYCOBACTERIUM SPEC QL CULT: NORMAL
SPECIMEN PREPARATION: NORMAL
SPECIMEN SOURCE: NORMAL

## 2024-01-01 LAB
BACTERIA SPEC CULT: NORMAL
SERVICE CMNT-IMP: NORMAL

## 2024-01-08 LAB
BACTERIA SPEC CULT: NORMAL
SERVICE CMNT-IMP: NORMAL

## 2024-01-15 LAB
BACTERIA SPEC CULT: NORMAL
SERVICE CMNT-IMP: NORMAL

## 2024-01-22 LAB
BACTERIA SPEC CULT: NORMAL
SERVICE CMNT-IMP: NORMAL

## 2024-02-06 LAB
ACID FAST STN SPEC: NEGATIVE
MYCOBACTERIUM SPEC QL CULT: NEGATIVE
SPECIMEN PREPARATION: NORMAL
SPECIMEN SOURCE: NORMAL

## 2024-02-08 LAB
FAX TO NUMBER: NORMAL
TEST RESULTS FAXED TO: NORMAL

## 2024-02-12 LAB
FAX TO NUMBER: NORMAL
TEST RESULTS FAXED TO: NORMAL

## 2024-02-21 ENCOUNTER — HOSPITAL ENCOUNTER (OUTPATIENT)
Facility: HOSPITAL | Age: 64
Discharge: HOME | End: 2024-02-24
Payer: COMMERCIAL

## 2024-02-21 DIAGNOSIS — Z01.818 PRE-OP TESTING: ICD-10-CM

## 2024-02-21 LAB
ANION GAP SERPL CALC-SCNC: 2 MMOL/L (ref 3–18)
APTT PPP: 24.6 SEC (ref 23–36.4)
BUN SERPL-MCNC: 20 MG/DL (ref 7–18)
BUN/CREAT SERPL: 23 (ref 12–20)
CALCIUM SERPL-MCNC: 9.3 MG/DL (ref 8.5–10.1)
CHLORIDE SERPL-SCNC: 107 MMOL/L (ref 100–111)
CO2 SERPL-SCNC: 30 MMOL/L (ref 21–32)
CREAT SERPL-MCNC: 0.88 MG/DL (ref 0.6–1.3)
EKG ATRIAL RATE: 47 BPM
EKG DIAGNOSIS: NORMAL
EKG P AXIS: 82 DEGREES
EKG P-R INTERVAL: 170 MS
EKG Q-T INTERVAL: 446 MS
EKG QRS DURATION: 78 MS
EKG QTC CALCULATION (BAZETT): 394 MS
EKG R AXIS: 65 DEGREES
EKG T AXIS: 73 DEGREES
EKG VENTRICULAR RATE: 47 BPM
ERYTHROCYTE [DISTWIDTH] IN BLOOD BY AUTOMATED COUNT: 12.7 % (ref 11.6–14.5)
GLUCOSE SERPL-MCNC: 79 MG/DL (ref 74–99)
HCT VFR BLD AUTO: 42.7 % (ref 35–45)
HGB BLD-MCNC: 13.5 G/DL (ref 12–16)
INR PPP: 1 (ref 0.9–1.1)
MCH RBC QN AUTO: 29.8 PG (ref 24–34)
MCHC RBC AUTO-ENTMCNC: 31.6 G/DL (ref 31–37)
MCV RBC AUTO: 94.3 FL (ref 78–100)
NRBC # BLD: 0 K/UL (ref 0–0.01)
NRBC BLD-RTO: 0 PER 100 WBC
PLATELET # BLD AUTO: 212 K/UL (ref 135–420)
PMV BLD AUTO: 10.8 FL (ref 9.2–11.8)
POTASSIUM SERPL-SCNC: 4.9 MMOL/L (ref 3.5–5.5)
PROTHROMBIN TIME: 12.9 SEC (ref 11.9–14.7)
RBC # BLD AUTO: 4.53 M/UL (ref 4.2–5.3)
SODIUM SERPL-SCNC: 139 MMOL/L (ref 136–145)
WBC # BLD AUTO: 5.2 K/UL (ref 4.6–13.2)

## 2024-02-21 PROCEDURE — 85730 THROMBOPLASTIN TIME PARTIAL: CPT

## 2024-02-21 PROCEDURE — 93005 ELECTROCARDIOGRAM TRACING: CPT | Performed by: INTERNAL MEDICINE

## 2024-02-21 PROCEDURE — 85610 PROTHROMBIN TIME: CPT

## 2024-02-21 PROCEDURE — 80048 BASIC METABOLIC PNL TOTAL CA: CPT

## 2024-02-21 PROCEDURE — 85027 COMPLETE CBC AUTOMATED: CPT

## 2024-02-27 ENCOUNTER — ANESTHESIA (OUTPATIENT)
Facility: HOSPITAL | Age: 64
End: 2024-02-27
Payer: COMMERCIAL

## 2024-02-27 ENCOUNTER — APPOINTMENT (OUTPATIENT)
Facility: HOSPITAL | Age: 64
End: 2024-02-27
Attending: INTERNAL MEDICINE
Payer: COMMERCIAL

## 2024-02-27 ENCOUNTER — HOSPITAL ENCOUNTER (OUTPATIENT)
Facility: HOSPITAL | Age: 64
Setting detail: OUTPATIENT SURGERY
Discharge: HOME | End: 2024-02-27
Attending: INTERNAL MEDICINE | Admitting: INTERNAL MEDICINE
Payer: COMMERCIAL

## 2024-02-27 ENCOUNTER — ANESTHESIA EVENT (OUTPATIENT)
Facility: HOSPITAL | Age: 64
End: 2024-02-27
Payer: COMMERCIAL

## 2024-02-27 VITALS
TEMPERATURE: 97.7 F | OXYGEN SATURATION: 100 % | WEIGHT: 137.1 LBS | HEIGHT: 67 IN | DIASTOLIC BLOOD PRESSURE: 73 MMHG | SYSTOLIC BLOOD PRESSURE: 118 MMHG | BODY MASS INDEX: 21.52 KG/M2 | HEART RATE: 67 BPM | RESPIRATION RATE: 17 BRPM

## 2024-02-27 PROCEDURE — 2709999900 HC NON-CHARGEABLE SUPPLY: Performed by: INTERNAL MEDICINE

## 2024-02-27 PROCEDURE — 7100000010 HC PHASE II RECOVERY - FIRST 15 MIN: Performed by: INTERNAL MEDICINE

## 2024-02-27 PROCEDURE — 71045 X-RAY EXAM CHEST 1 VIEW: CPT

## 2024-02-27 PROCEDURE — 87205 SMEAR GRAM STAIN: CPT

## 2024-02-27 PROCEDURE — 87077 CULTURE AEROBIC IDENTIFY: CPT

## 2024-02-27 PROCEDURE — 88305 TISSUE EXAM BY PATHOLOGIST: CPT

## 2024-02-27 PROCEDURE — 87206 SMEAR FLUORESCENT/ACID STAI: CPT

## 2024-02-27 PROCEDURE — 2580000003 HC RX 258: Performed by: INTERNAL MEDICINE

## 2024-02-27 PROCEDURE — 87185 SC STD ENZYME DETCJ PER NZM: CPT

## 2024-02-27 PROCEDURE — 2580000003 HC RX 258: Performed by: ANESTHESIOLOGY

## 2024-02-27 PROCEDURE — 7100000000 HC PACU RECOVERY - FIRST 15 MIN: Performed by: INTERNAL MEDICINE

## 2024-02-27 PROCEDURE — 7100000011 HC PHASE II RECOVERY - ADDTL 15 MIN: Performed by: INTERNAL MEDICINE

## 2024-02-27 PROCEDURE — 3600007512: Performed by: INTERNAL MEDICINE

## 2024-02-27 PROCEDURE — 88112 CYTOPATH CELL ENHANCE TECH: CPT

## 2024-02-27 PROCEDURE — 7100000001 HC PACU RECOVERY - ADDTL 15 MIN: Performed by: INTERNAL MEDICINE

## 2024-02-27 PROCEDURE — 3600007502: Performed by: INTERNAL MEDICINE

## 2024-02-27 PROCEDURE — 3700000001 HC ADD 15 MINUTES (ANESTHESIA): Performed by: INTERNAL MEDICINE

## 2024-02-27 PROCEDURE — 2500000003 HC RX 250 WO HCPCS

## 2024-02-27 PROCEDURE — 3700000000 HC ANESTHESIA ATTENDED CARE: Performed by: INTERNAL MEDICINE

## 2024-02-27 PROCEDURE — 87070 CULTURE OTHR SPECIMN AEROBIC: CPT

## 2024-02-27 PROCEDURE — 6360000002 HC RX W HCPCS

## 2024-02-27 PROCEDURE — 87102 FUNGUS ISOLATION CULTURE: CPT

## 2024-02-27 PROCEDURE — 87116 MYCOBACTERIA CULTURE: CPT

## 2024-02-27 RX ORDER — ACETAMINOPHEN 325 MG/1
650 TABLET ORAL
Status: DISCONTINUED | OUTPATIENT
Start: 2024-02-27 | End: 2024-02-27 | Stop reason: HOSPADM

## 2024-02-27 RX ORDER — IPRATROPIUM BROMIDE AND ALBUTEROL SULFATE 2.5; .5 MG/3ML; MG/3ML
1 SOLUTION RESPIRATORY (INHALATION)
Status: DISCONTINUED | OUTPATIENT
Start: 2024-02-27 | End: 2024-02-27 | Stop reason: HOSPADM

## 2024-02-27 RX ORDER — SODIUM CHLORIDE 0.9 % (FLUSH) 0.9 %
5-40 SYRINGE (ML) INJECTION EVERY 12 HOURS SCHEDULED
Status: DISCONTINUED | OUTPATIENT
Start: 2024-02-27 | End: 2024-02-27 | Stop reason: HOSPADM

## 2024-02-27 RX ORDER — LIDOCAINE HYDROCHLORIDE 20 MG/ML
INJECTION, SOLUTION INTRAVENOUS PRN
Status: DISCONTINUED | OUTPATIENT
Start: 2024-02-27 | End: 2024-02-27 | Stop reason: SDUPTHER

## 2024-02-27 RX ORDER — MIDAZOLAM HYDROCHLORIDE 1 MG/ML
INJECTION INTRAMUSCULAR; INTRAVENOUS PRN
Status: DISCONTINUED | OUTPATIENT
Start: 2024-02-27 | End: 2024-02-27 | Stop reason: SDUPTHER

## 2024-02-27 RX ORDER — PROPOFOL 10 MG/ML
INJECTION, EMULSION INTRAVENOUS PRN
Status: DISCONTINUED | OUTPATIENT
Start: 2024-02-27 | End: 2024-02-27 | Stop reason: SDUPTHER

## 2024-02-27 RX ORDER — FENTANYL CITRATE 50 UG/ML
50 INJECTION, SOLUTION INTRAMUSCULAR; INTRAVENOUS EVERY 5 MIN PRN
Status: DISCONTINUED | OUTPATIENT
Start: 2024-02-27 | End: 2024-02-27 | Stop reason: HOSPADM

## 2024-02-27 RX ORDER — OXYCODONE HYDROCHLORIDE 5 MG/1
5 TABLET ORAL
Status: DISCONTINUED | OUTPATIENT
Start: 2024-02-27 | End: 2024-02-27 | Stop reason: HOSPADM

## 2024-02-27 RX ORDER — ROCURONIUM BROMIDE 10 MG/ML
INJECTION, SOLUTION INTRAVENOUS PRN
Status: DISCONTINUED | OUTPATIENT
Start: 2024-02-27 | End: 2024-02-27 | Stop reason: SDUPTHER

## 2024-02-27 RX ORDER — HYDROMORPHONE HYDROCHLORIDE 1 MG/ML
0.25 INJECTION, SOLUTION INTRAMUSCULAR; INTRAVENOUS; SUBCUTANEOUS EVERY 5 MIN PRN
Status: DISCONTINUED | OUTPATIENT
Start: 2024-02-27 | End: 2024-02-27 | Stop reason: HOSPADM

## 2024-02-27 RX ORDER — DIPHENHYDRAMINE HYDROCHLORIDE 50 MG/ML
12.5 INJECTION INTRAMUSCULAR; INTRAVENOUS
Status: DISCONTINUED | OUTPATIENT
Start: 2024-02-27 | End: 2024-02-27 | Stop reason: HOSPADM

## 2024-02-27 RX ORDER — DEXAMETHASONE SODIUM PHOSPHATE 4 MG/ML
INJECTION, SOLUTION INTRA-ARTICULAR; INTRALESIONAL; INTRAMUSCULAR; INTRAVENOUS; SOFT TISSUE PRN
Status: DISCONTINUED | OUTPATIENT
Start: 2024-02-27 | End: 2024-02-27 | Stop reason: SDUPTHER

## 2024-02-27 RX ORDER — SODIUM CHLORIDE 9 MG/ML
INJECTION, SOLUTION INTRAVENOUS CONTINUOUS
Status: DISCONTINUED | OUTPATIENT
Start: 2024-02-27 | End: 2024-02-27 | Stop reason: HOSPADM

## 2024-02-27 RX ORDER — MEPERIDINE HYDROCHLORIDE 50 MG/ML
12.5 INJECTION INTRAMUSCULAR; INTRAVENOUS; SUBCUTANEOUS EVERY 5 MIN PRN
Status: DISCONTINUED | OUTPATIENT
Start: 2024-02-27 | End: 2024-02-27 | Stop reason: HOSPADM

## 2024-02-27 RX ORDER — EPHEDRINE SULFATE/0.9% NACL/PF 50 MG/5 ML
SYRINGE (ML) INTRAVENOUS PRN
Status: DISCONTINUED | OUTPATIENT
Start: 2024-02-27 | End: 2024-02-27 | Stop reason: SDUPTHER

## 2024-02-27 RX ORDER — SODIUM CHLORIDE 9 MG/ML
INJECTION, SOLUTION INTRAVENOUS PRN
Status: DISCONTINUED | OUTPATIENT
Start: 2024-02-27 | End: 2024-02-27 | Stop reason: HOSPADM

## 2024-02-27 RX ORDER — ONDANSETRON 2 MG/ML
4 INJECTION INTRAMUSCULAR; INTRAVENOUS
Status: DISCONTINUED | OUTPATIENT
Start: 2024-02-27 | End: 2024-02-27 | Stop reason: HOSPADM

## 2024-02-27 RX ORDER — FENTANYL CITRATE 50 UG/ML
INJECTION, SOLUTION INTRAMUSCULAR; INTRAVENOUS PRN
Status: DISCONTINUED | OUTPATIENT
Start: 2024-02-27 | End: 2024-02-27 | Stop reason: SDUPTHER

## 2024-02-27 RX ORDER — LABETALOL HYDROCHLORIDE 5 MG/ML
10 INJECTION, SOLUTION INTRAVENOUS
Status: DISCONTINUED | OUTPATIENT
Start: 2024-02-27 | End: 2024-02-27 | Stop reason: HOSPADM

## 2024-02-27 RX ORDER — SUCCINYLCHOLINE/SOD CL,ISO/PF 100 MG/5ML
SYRINGE (ML) INTRAVENOUS PRN
Status: DISCONTINUED | OUTPATIENT
Start: 2024-02-27 | End: 2024-02-27 | Stop reason: SDUPTHER

## 2024-02-27 RX ORDER — HALOPERIDOL 5 MG/ML
1 INJECTION INTRAMUSCULAR
Status: DISCONTINUED | OUTPATIENT
Start: 2024-02-27 | End: 2024-02-27 | Stop reason: HOSPADM

## 2024-02-27 RX ORDER — ONDANSETRON 2 MG/ML
INJECTION INTRAMUSCULAR; INTRAVENOUS PRN
Status: DISCONTINUED | OUTPATIENT
Start: 2024-02-27 | End: 2024-02-27 | Stop reason: SDUPTHER

## 2024-02-27 RX ORDER — SODIUM CHLORIDE 0.9 % (FLUSH) 0.9 %
5-40 SYRINGE (ML) INJECTION PRN
Status: DISCONTINUED | OUTPATIENT
Start: 2024-02-27 | End: 2024-02-27 | Stop reason: HOSPADM

## 2024-02-27 RX ADMIN — Medication 10 MG: at 13:54

## 2024-02-27 RX ADMIN — DEXAMETHASONE SODIUM PHOSPHATE 4 MG: 4 INJECTION, SOLUTION INTRAMUSCULAR; INTRAVENOUS at 13:45

## 2024-02-27 RX ADMIN — PROPOFOL 120 MG: 10 INJECTION, EMULSION INTRAVENOUS at 13:41

## 2024-02-27 RX ADMIN — SODIUM CHLORIDE 125 ML: 9 INJECTION, SOLUTION INTRAVENOUS at 15:32

## 2024-02-27 RX ADMIN — MIDAZOLAM 2 MG: 1 INJECTION INTRAMUSCULAR; INTRAVENOUS at 13:36

## 2024-02-27 RX ADMIN — Medication 10 MG: at 14:02

## 2024-02-27 RX ADMIN — FENTANYL CITRATE 50 MCG: 50 INJECTION, SOLUTION INTRAMUSCULAR; INTRAVENOUS at 13:48

## 2024-02-27 RX ADMIN — SODIUM CHLORIDE: 900 INJECTION, SOLUTION INTRAVENOUS at 11:59

## 2024-02-27 RX ADMIN — SODIUM CHLORIDE: 900 INJECTION, SOLUTION INTRAVENOUS at 14:00

## 2024-02-27 RX ADMIN — ONDANSETRON HYDROCHLORIDE 4 MG: 2 INJECTION INTRAMUSCULAR; INTRAVENOUS at 13:45

## 2024-02-27 RX ADMIN — LIDOCAINE HYDROCHLORIDE 60 MG: 20 INJECTION, SOLUTION INTRAVENOUS at 13:41

## 2024-02-27 RX ADMIN — FENTANYL CITRATE 50 MCG: 50 INJECTION, SOLUTION INTRAMUSCULAR; INTRAVENOUS at 13:38

## 2024-02-27 RX ADMIN — Medication 60 MG: at 13:41

## 2024-02-27 RX ADMIN — PROPOFOL 30 MG: 10 INJECTION, EMULSION INTRAVENOUS at 13:48

## 2024-02-27 RX ADMIN — ROCURONIUM BROMIDE 5 MG: 10 INJECTION, SOLUTION INTRAVENOUS at 13:38

## 2024-02-27 ASSESSMENT — PAIN - FUNCTIONAL ASSESSMENT: PAIN_FUNCTIONAL_ASSESSMENT: 0-10

## 2024-02-27 ASSESSMENT — PAIN SCALES - GENERAL: PAINLEVEL_OUTOF10: 0

## 2024-02-27 NOTE — PERIOP NOTE
TRANSFER - IN REPORT:    Verbal report received from Marian Ayers CRNA RN  on Sylvia Arriaga  being received from OR  for routine progression of patient care      Report consisted of patient's Situation, Background, Assessment and   Recommendations(SBAR).     Information from the following report(s) Adult Overview, Surgery Report, Intake/Output, and MAR was reviewed with the receiving nurse.    Opportunity for questions and clarification was provided.      Assessment completed upon patient's arrival to unit and care assumed.

## 2024-02-27 NOTE — ANESTHESIA POSTPROCEDURE EVALUATION
Department of Anesthesiology  Postprocedure Note    Patient: Sylvia Arriaga  MRN: [id number]  YOB: 1960  Date of evaluation: 2/27/2024    Procedure Summary       Date: 02/27/24 Room / Location: [facility] ENDO TRAVEL / [facility] ENDOSCOPY    Anesthesia Start: 1336 Anesthesia Stop: 1425    Procedure: BRONCHOSCOPY; BRONCHIAL WASHINGS; (Chest) Diagnosis:       Bronchiectasis without acute exacerbation (HCC)      (Bronchiectasis without acute exacerbation (HCC) [J47.9])    Surgeons: Cecilio Rico MD Responsible Provider: Missy Lombardo MD    Anesthesia Type: General ASA Status: 2            Anesthesia Type: General    Robinson Phase I:      Robinson Phase II:      Anesthesia Post Evaluation    Patient location during evaluation: PACU  Patient participation: complete - patient participated  Level of consciousness: awake and alert  Pain score: 0  Airway patency: patent  Nausea & Vomiting: no nausea  Cardiovascular status: blood pressure returned to baseline  Respiratory status: acceptable and room air  Hydration status: euvolemic  Pain management: satisfactory to patient    No notable events documented.

## 2024-02-27 NOTE — PROCEDURES
[facility]  Pulmonary, Critical Care, and Sleep Medicine    Name: Sylvia Arriaga MRN: [id number]   : 1960 Hospital: [facility]   Date: 2024          Diagnostic Bronchoscopy, Bronchio-Alveolar Lavage (BAL)      Pre-procedure diagnosis  1. Pulmonary Infiltrates    Post procedure diagnosis  Same    Procedures  1. Diagnostic Bronchoscopy  2. Bronchio-Alveolar Lavage (BAL) from Right Middle lobe  3. Bronchio-Alveolar Lavage (BAL) from Right Upper lobe    Consent/Treatment: Informed consent was obtained from the  patient after risks, benefits and alternatives were explained. Timeout verified the correct patient and correct procedure.     Anesthesia:   General Anaesthesia  Endo-tracheal intubation  Oxygen and Ventilation by Anaesthesia team    Procedure Details:     The flexible bronchoscope was passed through the oral adapter.  The scope was passed through the Endotracheal tube into the trachea.     Airways surveillance:   -- The mid and distal trachea was normal, main arminda was normal.   -- The right-sided endobronchial anatomy was completely inspected and were found to be normal.   -- The left-sided endobronchial anatomy was completely inspected and were found to be normal.   -- No bleeding or endobronchial masses or nodules seen.   -- Thick yellow mucus plugs seen in the right sided airways and were suctioned    Specimens / Further Procedures:   Bronchio-Alveolar Lavage (BAL):  The bronchoscope was wedged in the Right Middle lobe segments and bronchoalveolar lavage was performed with 30 cc aliquots times 4; material was sent for aerobic culture, AFB culture, Fungus culture, cytology.    Bronchio-Alveolar Lavage (BAL):  The bronchoscope was wedged in the Right Upper lobe segments and bronchoalveolar lavage was performed with 30 cc aliquots times 4; material was sent for aerobic culture, AFB culture, Fungus culture, cytology.    Complications: none  No bleeding noted

## 2024-02-27 NOTE — PERIOP NOTE
TRANSFER - OUT REPORT:    Verbal report given to Damaris COLEMAN  on Sylvia Arriaga  being transferred to Phase iI  for routine progression of patient care       Report consisted of patient's Situation, Background, Assessment and   Recommendations(SBAR).     Information from the following report(s) Adult Overview, Surgery Report, Intake/Output, and MAR was reviewed with the receiving nurse.           Lines:   Peripheral IV 02/27/24 Left;Ventral Forearm (Active)   Site Assessment Clean, dry & intact 02/27/24 1429   Line Status Infusing 02/27/24 1429        Opportunity for questions and clarification was provided.      Patient transported with:  Registered Nurse

## 2024-02-27 NOTE — H&P
the left kidney.   SOFT TISSUES:  Unremarkable.   SKELETAL STRUCTURES:  No suspicious osseous lesions or acute osseous findings.  Surgical screws are partially imaged in the head of the left humerus.   IMPRESSION:   Mild chronic atelectasis or scarring in the right middle lobe with associated mild   bronchiectasis and bronchial wall thickening, and scattered multifocal patchy tree-in-bud type opacities in the right upper lobe.  This is suggestive of a chronic infectious or inflammatory bronchiolitis, including chronic atypical mycobacterial infection..        Please note: Voice-recognition software may have been used to generate this report, which may have resulted in some phonetic-based errors in grammar and contents. Even though attempts were made to correct all the mistakes, some may have been missed, and remained in the body of the document.    Cecilio Rico MD  2/27/2024

## 2024-02-27 NOTE — PERIOP NOTE
Reviewed PTA medication list with patient/caregiver and patient/caregiver denies any additional medications.     Patient admits to having a responsible adult care for them at home for at least 24 hours after surgery.    Patient encouraged to use gown warming system and informed that using said warming gown to regulate body temperature prior to a procedure has been shown to help reduce the risks of blood clots and infection.    Patient's pharmacy of choice verified and documented in PTA medication section.    Dual skin assessment & fall risk band verification completed with Mary Ellen LEVY RN.

## 2024-02-27 NOTE — ANESTHESIA PRE PROCEDURE
Department of Anesthesiology  Preprocedure Note       Name:  Sylvia Arriaga   Age:  63 y.o.  :  1960                                          MRN:  [id number]         Date:  2024      Surgeon: Surgeon(s):  Cecilio Rico MD    Procedure: Procedure(s):  BRONCHOSCOPY; BRONCHIAL WASHINGS;    Medications prior to admission:   Prior to Admission medications    Medication Sig Start Date End Date Taking? Authorizing Provider   vitamin D 25 MCG (1000 UT) CAPS Take 3 capsules by mouth daily    Provider, MD Yesenia       Current medications:    Current Facility-Administered Medications   Medication Dose Route Frequency Provider Last Rate Last Admin   • 0.9 % sodium chloride infusion   IntraVENous Continuous Cecilio Rico  mL/hr at 24 1159 New Bag at 24 1159       Allergies:    Allergies   Allergen Reactions   • Chlorhexidine Hives and Itching       Problem List:    Patient Active Problem List   Diagnosis Code   • Proximal humeral fracture S42.209A       Past Medical History:        Diagnosis Date   • Bronchiectasis (HCC) 2023    Dr. Castaneda   • Cancer (HCC)     BCC- left temple   • Chronic cough 2023   • Kidney stone 2019   • Other ill-defined conditions(799.89)     osteopenia   • Thyroid disease     goiter -benign- Brody- [facility]   • Wears glasses        Past Surgical History:        Procedure Laterality Date   • DILATION AND CURETTAGE OF UTERUS     • GI  10/17/2013    colonoscopy   • LITHOTRIPSY  2019   • ORTHOPEDIC SURGERY  2014    left shoulder ORIF   • ORTHOPEDIC SURGERY Left     5th metatarsal fracture repair   • OTHER SURGICAL HISTORY      MOHs left cheek-Basil cell carcinoma removed    • SALPINGO-OOPHORECTOMY Bilateral        Social History:    Social History     Tobacco Use   • Smoking status: Never   • Smokeless tobacco: Never   Substance Use Topics   • Alcohol use: Yes     Alcohol/week: 1.0 standard drink of alcohol     Comment: once per

## 2024-02-28 LAB — CYTOLOGY-NON GYN: NORMAL

## 2024-02-29 LAB
ACID FAST STN SPEC: NEGATIVE
ACID FAST STN SPEC: NEGATIVE
BACTERIA SPEC CULT: ABNORMAL
BACTERIA SPEC CULT: ABNORMAL
BACTERIA SPEC CULT: NORMAL
GRAM STN SPEC: ABNORMAL
GRAM STN SPEC: ABNORMAL
GRAM STN SPEC: NORMAL
GRAM STN SPEC: NORMAL
MYCOBACTERIUM SPEC QL CULT: NORMAL
MYCOBACTERIUM SPEC QL CULT: NORMAL
SERVICE CMNT-IMP: ABNORMAL
SERVICE CMNT-IMP: NORMAL
SPECIMEN PREPARATION: NORMAL
SPECIMEN PREPARATION: NORMAL
SPECIMEN SOURCE: NORMAL
SPECIMEN SOURCE: NORMAL

## 2024-03-04 LAB
BACTERIA SPEC CULT: NORMAL
BACTERIA SPEC CULT: NORMAL
SERVICE CMNT-IMP: NORMAL
SERVICE CMNT-IMP: NORMAL

## 2024-04-12 LAB
ACID FAST STN SPEC: NEGATIVE
ACID FAST STN SPEC: NEGATIVE
MYCOBACTERIUM SPEC QL CULT: NEGATIVE
MYCOBACTERIUM SPEC QL CULT: NEGATIVE
SPECIMEN PREPARATION: NORMAL
SPECIMEN PREPARATION: NORMAL
SPECIMEN SOURCE: NORMAL
SPECIMEN SOURCE: NORMAL

## 2024-07-08 NOTE — PROGRESS NOTES
Pt tolerated procedure well. Requested Prescriptions     Pending Prescriptions Disp Refills    ASPIRIN LOW DOSE 81 MG chewable tablet [Pharmacy Med Name: ASPIRIN 81 MG CHEWABLE TABLET] 30 tablet 0     Sig: TAKE 1 TABLET BY MOUTH EVERY DAY       Last Office Visit: Visit date not found  Next Office Visit: Visit date not found  Last Medication Refill: 11/14/2023 with 0 RF

## (undated) DEVICE — KENDALL SCD EXPRESS SLEEVES, KNEE LENGTH, MEDIUM: Brand: KENDALL SCD

## (undated) DEVICE — SINGLE USE SUCTION VALVE MAJ-209: Brand: SINGLE USE SUCTION VALVE (STERILE)

## (undated) DEVICE — NEEDLE ASPIR INNR 21GA OUTER 19GA L3X15MM TRNSBRONCH

## (undated) DEVICE — APPLICATOR COTTON TIP STRL 5/PK

## (undated) DEVICE — GOWN PLASTIC FILM THMBHKS UNIV BLUE: Brand: CARDINAL HEALTH

## (undated) DEVICE — AIRLIFE™ NASAL OXYGEN CANNULA CURVED, FLARED TIP WITH 14 FOOT (4.3 M) CRUSH-RESISTANT TUBING, OVER-THE-EAR STYLE: Brand: AIRLIFE™

## (undated) DEVICE — 3M™ BAIR PAWS® OPERATING ROOM GOWN KIT, STANDARD, 30 PER CASE 84001: Brand: BAIR PAWS™

## (undated) DEVICE — SYSTEM SPUT COLL 50ML CONIC CNTRFUG TB CONT W/ DURABLE 3

## (undated) DEVICE — FORCEP BX 3 1.8 MMX100 CM 2 MM NDL FOR PULM BLU RADIAL JAW

## (undated) DEVICE — TRAP SPEC 40CC MUCUS OPN SUCT

## (undated) DEVICE — GLOVE ORANGE PI 7 1/2   MSG9075

## (undated) DEVICE — TOWEL,OR,DSP,ST,BLUE,STD,4/PK,20PK/CS: Brand: MEDLINE

## (undated) DEVICE — SET ADMIN L104IN 20 GTT GRAV RLER CLMP SMRT SITE NDL FREE

## (undated) DEVICE — BE 105-8 BRONCHOSCOPE SWIVEL - 15MM ID/22MM OD (PATIENT PORT) X15MM OD (EQUIPMENT PORT). REUSABLE.  FITS COMPONENTS OF ADULT VENTILATOR CIRCUITS.  MOLDED OF POLYETHERIMIDE. INCLUDES TWO SILICONE RUBBER CAPS; ONE CAP ALLOWS FOR THE USE OF A SUCTIONING CATHETER WHILE THE OTHER CAP ALLOWS FOR THE USE OF A FIBER-OPTIC BRONCHOSCOPE WITHOUT SIGNIFICANT LOSS OF PEEP.: Brand: BE 105-8 BRONCHOSCOPE SWIVEL

## (undated) DEVICE — MASK SURG REG ORNG LEV 3 SFTY SEAL 4 LAYR SFT INNR LINING

## (undated) DEVICE — BLADE, TONGUE, 6", STERILE: Brand: MEDLINE

## (undated) DEVICE — CATHETER SUCT TR FL TIP 14FR W/ O CTRL

## (undated) DEVICE — SYRINGE MED 10ML SLIP TIP BLNT FILL AND LUERLOCK DISP

## (undated) DEVICE — SYRINGE 20ML LL S/C 50

## (undated) DEVICE — NEEDLE ASPIR TRNSBRONCH 1.8 MM 21 GAX15 MM 130 CM EXCELON

## (undated) DEVICE — TUBING, SUCTION, 1/4" X 12', STRAIGHT: Brand: MEDLINE

## (undated) DEVICE — YANKAUER,FLEXIBLE HANDLE,REGLR CAPACITY: Brand: MEDLINE INDUSTRIES, INC.

## (undated) DEVICE — 1860 HEALTH CARE N95 MASK, 20EACH/BOX  6 BX/C: Brand: 3M™

## (undated) DEVICE — SINGLE USE BIOPSY VALVE MAJ-210: Brand: SINGLE USE BIOPSY VALVE (STERILE)

## (undated) DEVICE — DEVON™ KNEE AND BODY STRAP 60" X 3" (1.5 M X 7.6 CM): Brand: DEVON

## (undated) DEVICE — GARMENT,MEDLINE,DVT,INT,CALF,MED, GEN2: Brand: MEDLINE

## (undated) DEVICE — BRUSH CYTO L140CM DIA1.5MM WRK CHN 2MM BRIST SHTH RNG HNDL

## (undated) DEVICE — ADAPTER TBNG DIA15MM SWVL FBROPT BRONCHSCP TERM 2 AXIS PEEP

## (undated) DEVICE — SYRINGE MED 3ML NDL 22GA L1 1/2IN REG BVL SFGLDE

## (undated) DEVICE — LINER SUCT CANSTR 3000CC PLAS SFT PRE ASSEMB W/OUT TBNG W/

## (undated) DEVICE — FORCEPS BX L100CM DIA1.8MM WRK CHN 2MM PULM S STL RAD JAW 4

## (undated) DEVICE — BASIN EMSIS 16OZ GRAPHITE PLAS KID SHP MOLD GRAD FOR ORAL

## (undated) DEVICE — CONTAINER FORMALIN PREFILLED 10% NBF 40ML

## (undated) DEVICE — SLEEVE COMPR STD 12 IN FOR 165IN CALF COMFORT VENODYNE SYS

## (undated) DEVICE — BITE BLOCK ENDOSCP UNIV AD 6 TO 9.4 MM

## (undated) DEVICE — NEBULIZER,KIT,T-MOUTHPIECE,6"RESER,7'TUB: Brand: MEDLINE

## (undated) DEVICE — 1860S HEALTH CARE RESPIRATOR N95 120EA/C: Brand: 3M™